# Patient Record
Sex: MALE | Race: WHITE | NOT HISPANIC OR LATINO | Employment: OTHER | RURAL
[De-identification: names, ages, dates, MRNs, and addresses within clinical notes are randomized per-mention and may not be internally consistent; named-entity substitution may affect disease eponyms.]

---

## 2020-08-10 ENCOUNTER — TELEPHONE (OUTPATIENT)
Dept: URGENT CARE | Facility: CLINIC | Age: 75
End: 2020-08-10

## 2020-08-10 NOTE — TELEPHONE ENCOUNTER
Please call to check on the patient, and please make sure he's arranged follow-up with his PCP.  I want to be certain that his symptoms have improved.  Please call me directly for concerns, and I'm happy to call him as well.

## 2020-08-10 NOTE — TELEPHONE ENCOUNTER
Spoke with Mr. Wilkes. Feeling no better but has made a follow-up appointment to see a specialist, Dr. Dent. Will  a CD of his xrays tomorrow.

## 2021-10-05 RX ORDER — GLIMEPIRIDE 4 MG/1
4 TABLET ORAL 2 TIMES DAILY
COMMUNITY

## 2021-10-05 RX ORDER — ALENDRONATE SODIUM 70 MG/1
70 TABLET ORAL
COMMUNITY

## 2021-10-12 ENCOUNTER — ANESTHESIA EVENT (OUTPATIENT)
Dept: GASTROENTEROLOGY | Facility: HOSPITAL | Age: 76
End: 2021-10-12

## 2021-10-12 NOTE — ANESTHESIA PREPROCEDURE EVALUATION
Anesthesia Evaluation     Patient summary reviewed and Nursing notes reviewed   no history of anesthetic complications:  NPO Solid Status: > 8 hours  NPO Liquid Status: > 8 hours           Airway   Dental      Pulmonary    Cardiovascular     PT is on anticoagulation therapy  Patient on routine beta blocker    (+) hypertension, valvular problems/murmurs MR, past MI , CAD, cardiac stents dysrhythmias Atrial Fib, hyperlipidemia,       Neuro/Psych  GI/Hepatic/Renal/Endo    (+)   renal disease, diabetes mellitus, thyroid problem     Musculoskeletal     (+) back pain,   Abdominal    Substance History      OB/GYN          Other   blood dyscrasia anemia,     ROS/Med Hx Other: Colon polyps, goiter, proteinuria    PSH  ANGIOPLASTY CORONARY STENT PLACEMENT                   Anesthesia Plan    ASA 3     MAC   (Patient identified; pre-operative vital signs, all relevant labs/studies, complete medical/surgical/anesthetic history, full medication list, full allergy list, and NPO status obtained/reviewed; physical assessment performed; anesthetic options, side effects, potential complications, risks, and benefits discussed; questions answered; written anesthesia consent obtained; patient cleared for procedure; anesthesia machine and equipment checked and functioning)    Anesthetic plan, all risks, benefits, and alternatives have been provided, discussed and informed consent has been obtained with: patient.

## 2021-10-13 ENCOUNTER — HOSPITAL ENCOUNTER (OUTPATIENT)
Facility: HOSPITAL | Age: 76
Setting detail: HOSPITAL OUTPATIENT SURGERY
Discharge: HOME | End: 2021-10-13
Attending: INTERNAL MEDICINE | Admitting: INTERNAL MEDICINE

## 2021-10-13 ENCOUNTER — ANESTHESIA (OUTPATIENT)
Dept: GASTROENTEROLOGY | Facility: HOSPITAL | Age: 76
End: 2021-10-13

## 2021-10-13 ENCOUNTER — ON CAMPUS - OUTPATIENT (AMBULATORY)
Dept: URBAN - METROPOLITAN AREA HOSPITAL 85 | Facility: HOSPITAL | Age: 76
End: 2021-10-13
Payer: COMMERCIAL

## 2021-10-13 VITALS
HEART RATE: 62 BPM | HEIGHT: 68 IN | RESPIRATION RATE: 20 BRPM | SYSTOLIC BLOOD PRESSURE: 165 MMHG | TEMPERATURE: 98.3 F | BODY MASS INDEX: 26.37 KG/M2 | OXYGEN SATURATION: 98 % | WEIGHT: 174 LBS | DIASTOLIC BLOOD PRESSURE: 81 MMHG

## 2021-10-13 DIAGNOSIS — D12.3 BENIGN NEOPLASM OF TRANSVERSE COLON: ICD-10-CM

## 2021-10-13 DIAGNOSIS — Z86.010 PERSONAL HISTORY OF COLONIC POLYPS: ICD-10-CM

## 2021-10-13 DIAGNOSIS — D12.5 BENIGN NEOPLASM OF SIGMOID COLON: ICD-10-CM

## 2021-10-13 DIAGNOSIS — D12.2 BENIGN NEOPLASM OF ASCENDING COLON: ICD-10-CM

## 2021-10-13 DIAGNOSIS — K57.30 DIVERTICULOSIS OF LARGE INTESTINE WITHOUT PERFORATION OR ABS: ICD-10-CM

## 2021-10-13 DIAGNOSIS — Z08 ENCOUNTER FOR FOLLOW-UP EXAMINATION AFTER COMPLETED TREATMEN: ICD-10-CM

## 2021-10-13 LAB
GLUCOSE BLDC GLUCOMTR-MCNC: 213 MG/DL (ref 70–105)
GLUCOSE BLDC GLUCOMTR-MCNC: 69 MG/DL (ref 70–105)
INR PPP: 1.12 (ref 2–3)
PROTHROMBIN TIME: 12.3 SECONDS (ref 19.4–28.5)

## 2021-10-13 PROCEDURE — 85610 PROTHROMBIN TIME: CPT | Performed by: INTERNAL MEDICINE

## 2021-10-13 PROCEDURE — 45385 COLONOSCOPY W/LESION REMOVAL: CPT | Mod: PT | Performed by: INTERNAL MEDICINE

## 2021-10-13 PROCEDURE — 82962 GLUCOSE BLOOD TEST: CPT

## 2021-10-13 PROCEDURE — 25010000002 PROPOFOL 10 MG/ML EMULSION: Performed by: ANESTHESIOLOGY

## 2021-10-13 PROCEDURE — 88305 TISSUE EXAM BY PATHOLOGIST: CPT | Performed by: INTERNAL MEDICINE

## 2021-10-13 RX ORDER — PROPOFOL 10 MG/ML
VIAL (ML) INTRAVENOUS AS NEEDED
Status: DISCONTINUED | OUTPATIENT
Start: 2021-10-13 | End: 2021-10-13 | Stop reason: SURG

## 2021-10-13 RX ORDER — SODIUM CHLORIDE 9 MG/ML
50 INJECTION, SOLUTION INTRAVENOUS CONTINUOUS
Status: DISCONTINUED | OUTPATIENT
Start: 2021-10-13 | End: 2021-10-13 | Stop reason: HOSPADM

## 2021-10-13 RX ORDER — LIDOCAINE HYDROCHLORIDE 10 MG/ML
INJECTION, SOLUTION EPIDURAL; INFILTRATION; INTRACAUDAL; PERINEURAL AS NEEDED
Status: DISCONTINUED | OUTPATIENT
Start: 2021-10-13 | End: 2021-10-13 | Stop reason: SURG

## 2021-10-13 RX ADMIN — LIDOCAINE HYDROCHLORIDE 50 MG: 10 INJECTION, SOLUTION EPIDURAL; INFILTRATION; INTRACAUDAL; PERINEURAL at 10:15

## 2021-10-13 RX ADMIN — PROPOFOL 180 MG: 10 INJECTION, EMULSION INTRAVENOUS at 10:15

## 2021-10-13 RX ADMIN — SODIUM CHLORIDE 50 ML/HR: 9 INJECTION, SOLUTION INTRAVENOUS at 08:35

## 2021-10-13 NOTE — ANESTHESIA POSTPROCEDURE EVALUATION
Patient: Ish Wilkes    Procedure Summary     Date: 10/13/21 Room / Location: [facility] ENDOSCOPY 1 / [facility] ENDOSCOPY    Anesthesia Start: 1009 Anesthesia Stop: 1037    Procedure: COLONOSCOPY with polypectomy x 5 (N/A ) Diagnosis:       Personal history of colonic polyps      (Personal history of colonic polyps [Z86.010])    Surgeons: Slick Cardenas MD Provider: Mingo Chase MD    Anesthesia Type: MAC ASA Status: 3          Anesthesia Type: MAC    Vitals  No vitals data found for the desired time range.          Post Anesthesia Care and Evaluation    Patient location during evaluation: PACU  Patient participation: complete - patient participated  Level of consciousness: awake  Pain scale: See nurse's notes for pain score.  Pain management: adequate  Airway patency: patent  Anesthetic complications: No anesthetic complications  PONV Status: none  Cardiovascular status: acceptable  Respiratory status: acceptable  Hydration status: acceptable    Comments: Patient seen and examined postoperatively; vital signs stable; SpO2 greater than or equal to 90%; cardiopulmonary status stable; nausea/vomiting adequately controlled; pain adequately controlled; no apparent anesthesia complications; patient discharged from anesthesia care when discharge criteria were met

## 2021-10-13 NOTE — OP NOTE
COLONOSCOPY  Procedure Report    Patient Name:  Ish Wilkes  YOB: 1945    Date of Surgery:  10/13/2021     Indications: Personal history of colon polyps    Pre-op Diagnosis:   Personal history of colonic polyps [Z86.010]         Post-op Diagnosis:  Colonoscopy with snare polypectomy x5  Mild diverticulosis  Small grade 2 internal hemorrhoids      Procedure(s):  COLONOSCOPY with polypectomy x 5    Staff:  Surgeon(s):  Slick Cardenas MD         Anesthesia: Monitored Anesthesia Care    Estimated Blood Loss: None  Implants:    Nothing was implanted during the procedure    Specimen:          1 ascending colon polyp  1 transverse colon polyp  3 sigmoid polyps    Complications:  None    Description of Procedure:  Informed consent was obtained from the patient.  They were placed in the left lateral decubitus position and IV conscious sedation was administered by anesthesia while being monitored continuously throughout the procedure.  Digital rectal exam was unremarkable.  There were no external hemorrhoid, fissure, or fistula.  The video Olympus colonoscope was introduced into the rectum advanced to the cecum where the ileocecal valve and appendiceal orifice were identified and photographed.  The prep was excellent.  On slow withdrawal close circumferential colonic mucosal inspection was performed.  There is a 6 mm semipedunculated polyp in the ascending colon cold snared and removed.  There was a 5 mm transverse colon polyp that was sessile.  It was cold snared removed.  There were 3 3 mm sigmoid polyps all sessile, cold snared and removed.  Retroflexion showed small grade 2 internal hemorrhoids.  There were few scattered sigmoid diverticula.  The scope was removed he tolerated the procedure well returned to recovery in stable condition.    Impression:  Colon polyps  Diverticulosis  Small internal hemorrhoids    Recommendations:  High-fiber diet  Call for any postop problems  Call in 3 days  for biopsies  No recall for colonoscopy  We appreciate the referral thank you      Slick Cardenas MD     Date: 10/13/2021  Time: 10:31 EDT

## 2021-10-13 NOTE — H&P
LOS: 0 days   Patient Care Team:  Ish Chacon MD as PCP - General (Internal Medicine)      Subjective     Interval History:     Subjective: Personal history of colon polyps      ROS:   No chest pain, shortness of breath, or cough.  No melena or hematochezia         Medication Review:   No current facility-administered medications for this encounter.    Current Outpatient Medications:   •  alendronate (FOSAMAX) 70 MG tablet, Take 70 mg by mouth Every 7 (Seven) Days., Disp: , Rfl:   •  amLODIPine (NORVASC) 5 MG tablet, Take 10 mg by mouth Daily., Disp: , Rfl:   •  atorvastatin (LIPITOR) 40 MG tablet, Take 40 mg by mouth Daily., Disp: , Rfl:   •  fenofibrate (TRICOR) 145 MG tablet, Take 145 mg by mouth Daily., Disp: , Rfl:   •  furosemide (LASIX) 40 MG tablet, Take 40 mg by mouth Daily., Disp: , Rfl:   •  glimepiride (AMARYL) 4 MG tablet, Take 4 mg by mouth 2 (two) times a day., Disp: , Rfl:   •  hydroCHLOROthiazide (HYDRODIURIL) 12.5 MG tablet, Take 12.5 mg by mouth Daily., Disp: , Rfl:   •  lisinopril (PRINIVIL,ZESTRIL) 40 MG tablet, Take 40 mg by mouth Daily., Disp: , Rfl:   •  metFORMIN (GLUCOPHAGE) 1000 MG tablet, Take 500 mg by mouth 2 (Two) Times a Day With Meals., Disp: , Rfl:   •  metoprolol tartrate (LOPRESSOR) 50 MG tablet, Take 50 mg by mouth Daily., Disp: , Rfl:   •  Multiple Vitamins-Minerals (ICAPS AREDS FORMULA PO), Take 1 tablet by mouth 2 (two) times a day., Disp: , Rfl:   •  pioglitazone (ACTOS) 45 MG tablet, Take 45 mg by mouth Daily., Disp: , Rfl:   •  potassium chloride (K-DUR,KLOR-CON) 20 MEQ CR tablet, Take 20 mEq by mouth 2 (Two) Times a Day., Disp: , Rfl:   •  warfarin (COUMADIN) 3 MG tablet, Take 3 mg by mouth Daily. LD 10/7, Disp: , Rfl:   •  glipizide (GLUCOTROL) 5 MG tablet, Take 5 mg by mouth 2 (Two) Times a Day Before Meals. No longer taking, Disp: , Rfl:   •  mupirocin (BACTROBAN) 2 % ointment, Apply  topically to the appropriate area as directed 3 (Three) Times a Day., Disp: 22  "g, Rfl: 0  •  Niacin ER 1000 MG tablet controlled-release, Take  by mouth. No longer taking, Disp: , Rfl:       Objective     Vital Signs  Vitals:    10/05/21 1509   Weight: 78 kg (172 lb)   Height: 172.7 cm (68\")       Physical Exam:    General Appearance:    Awake and alert, in no acute distress   Head:    Normocephalic, without obvious abnormality   Eyes:          Conjunctivae normal, anicteric sclera   Throat:   No oral lesions, no thrush, oral mucosa moist   Neck:   No adenopathy, supple, no JVD   CV/Lungs:     RRR, respirations regular, even and unlabored   Abdomen:     Soft, non-tender, no rebound or guarding, non-distended, no hepatosplenomegaly   Rectal:     Deferred   Extremities:   No edema, no cyanosis   Skin:   No bruising or rash, no jaundice        Results Review:    Lab Results (last 24 hours)     ** No results found for the last 24 hours. **          Imaging Results (Last 24 Hours)     ** No results found for the last 24 hours. **            Assessment/Plan   Proceed with scope and MAC anesthesia    Proceed with surveillance colonoscopy    Slick Cardenas MD  10/13/21  07:28 EDT  "

## 2021-10-13 NOTE — DISCHARGE INSTRUCTIONS
A responsible adult should stay with you and you should rest quietly for the rest of the day.    Do not drink alcohol, drive, operate any heavy machinery or power tools or make any legal/important decisions for the next 24 hours.     Progress your diet as tolerated.  If you begin to experience severe pain, increased shortness of breath, racing heartbeat or a fever above 101 F, seek immediate medical attention.     Follow up with MD as instructed. Call office for results in 3 to 5 days if needed.    DR CONCEPCION [phone]    RESTART COUMADIN 10/13/2021 AT NIGHTIME

## 2021-10-14 LAB
LAB AP CASE REPORT: NORMAL
PATH REPORT.FINAL DX SPEC: NORMAL
PATH REPORT.GROSS SPEC: NORMAL

## 2022-09-21 PROCEDURE — U0004 COV-19 TEST NON-CDC HGH THRU: HCPCS | Performed by: FAMILY MEDICINE

## 2022-10-21 ENCOUNTER — TRANSCRIBE ORDERS (OUTPATIENT)
Dept: ADMINISTRATIVE | Facility: HOSPITAL | Age: 77
End: 2022-10-21

## 2022-10-21 DIAGNOSIS — R80.9 PROTEINURIA, UNSPECIFIED TYPE: Primary | ICD-10-CM

## 2022-10-28 ENCOUNTER — APPOINTMENT (OUTPATIENT)
Dept: ULTRASOUND IMAGING | Facility: HOSPITAL | Age: 77
End: 2022-10-28

## 2022-11-04 ENCOUNTER — HOSPITAL ENCOUNTER (OUTPATIENT)
Dept: ULTRASOUND IMAGING | Facility: HOSPITAL | Age: 77
Discharge: HOME | End: 2022-11-04

## 2022-11-04 ENCOUNTER — LAB (OUTPATIENT)
Dept: LAB | Facility: HOSPITAL | Age: 77
End: 2022-11-04

## 2022-11-04 ENCOUNTER — TRANSCRIBE ORDERS (OUTPATIENT)
Dept: ADMINISTRATIVE | Facility: HOSPITAL | Age: 77
End: 2022-11-04

## 2022-11-04 DIAGNOSIS — R80.9 PROTEINURIA, UNSPECIFIED TYPE: ICD-10-CM

## 2022-11-04 DIAGNOSIS — R80.9 PROTEINURIA, UNSPECIFIED TYPE: Primary | ICD-10-CM

## 2022-11-04 LAB
ALBUMIN SERPL-MCNC: 3.2 G/DL (ref 3.5–5.2)
ALBUMIN/GLOB SERPL: 1.1 G/DL
ALP SERPL-CCNC: 68 U/L (ref 39–117)
ALT SERPL W P-5'-P-CCNC: 16 U/L (ref 1–41)
ANION GAP SERPL CALCULATED.3IONS-SCNC: 8 MMOL/L (ref 5–15)
AST SERPL-CCNC: 31 U/L (ref 1–40)
BACTERIA UR QL AUTO: NORMAL /HPF
BASOPHILS # BLD AUTO: 0.06 10*3/MM3 (ref 0–0.2)
BASOPHILS NFR BLD AUTO: 1.1 % (ref 0–1.5)
BILIRUB SERPL-MCNC: 0.3 MG/DL (ref 0–1.2)
BILIRUB UR QL STRIP: NEGATIVE
BUN SERPL-MCNC: 19 MG/DL (ref 8–23)
BUN/CREAT SERPL: 22.4 (ref 7–25)
CALCIUM SPEC-SCNC: 9.8 MG/DL (ref 8.6–10.5)
CHLORIDE SERPL-SCNC: 104 MMOL/L (ref 98–107)
CLARITY UR: CLEAR
CO2 SERPL-SCNC: 29 MMOL/L (ref 22–29)
COLOR UR: YELLOW
CREAT SERPL-MCNC: 0.85 MG/DL (ref 0.76–1.27)
CREAT UR-MCNC: 80.1 MG/DL
DEPRECATED RDW RBC AUTO: 46.9 FL (ref 37–54)
EGFRCR SERPLBLD CKD-EPI 2021: 88.9 ML/MIN/1.73
EOSINOPHIL # BLD AUTO: 0.4 10*3/MM3 (ref 0–0.4)
EOSINOPHIL NFR BLD AUTO: 7.2 % (ref 0.3–6.2)
ERYTHROCYTE [DISTWIDTH] IN BLOOD BY AUTOMATED COUNT: 14.1 % (ref 12.3–15.4)
GLOBULIN UR ELPH-MCNC: 2.9 GM/DL
GLUCOSE SERPL-MCNC: 205 MG/DL (ref 65–99)
GLUCOSE UR STRIP-MCNC: ABNORMAL MG/DL
HCT VFR BLD AUTO: 34.4 % (ref 37.5–51)
HGB BLD-MCNC: 11.2 G/DL (ref 13–17.7)
HGB UR QL STRIP.AUTO: NEGATIVE
HYALINE CASTS UR QL AUTO: NORMAL /LPF
IMM GRANULOCYTES # BLD AUTO: 0.02 10*3/MM3 (ref 0–0.05)
IMM GRANULOCYTES NFR BLD AUTO: 0.4 % (ref 0–0.5)
KETONES UR QL STRIP: NEGATIVE
LEUKOCYTE ESTERASE UR QL STRIP.AUTO: NEGATIVE
LYMPHOCYTES # BLD AUTO: 1 10*3/MM3 (ref 0.7–3.1)
LYMPHOCYTES NFR BLD AUTO: 18 % (ref 19.6–45.3)
MAGNESIUM SERPL-MCNC: 1.7 MG/DL (ref 1.6–2.4)
MCH RBC QN AUTO: 29.9 PG (ref 26.6–33)
MCHC RBC AUTO-ENTMCNC: 32.6 G/DL (ref 31.5–35.7)
MCV RBC AUTO: 92 FL (ref 79–97)
MONOCYTES # BLD AUTO: 0.82 10*3/MM3 (ref 0.1–0.9)
MONOCYTES NFR BLD AUTO: 14.8 % (ref 5–12)
NEUTROPHILS NFR BLD AUTO: 3.25 10*3/MM3 (ref 1.7–7)
NEUTROPHILS NFR BLD AUTO: 58.5 % (ref 42.7–76)
NITRITE UR QL STRIP: NEGATIVE
NRBC BLD AUTO-RTO: 0 /100 WBC (ref 0–0.2)
PH UR STRIP.AUTO: 6.5 [PH] (ref 5–8)
PLATELET # BLD AUTO: 367 10*3/MM3 (ref 140–450)
PMV BLD AUTO: 11.2 FL (ref 6–12)
POTASSIUM SERPL-SCNC: 5.9 MMOL/L (ref 3.5–5.2)
PROT ?TM UR-MCNC: 336.1 MG/DL
PROT SERPL-MCNC: 6.1 G/DL (ref 6–8.5)
PROT UR QL STRIP: ABNORMAL
PROT/CREAT UR: 4196 MG/G CREA (ref 0–200)
RBC # BLD AUTO: 3.74 10*6/MM3 (ref 4.14–5.8)
RBC # UR STRIP: NORMAL /HPF
REF LAB TEST METHOD: NORMAL
SODIUM SERPL-SCNC: 141 MMOL/L (ref 136–145)
SP GR UR STRIP: 1.02 (ref 1–1.03)
SQUAMOUS #/AREA URNS HPF: NORMAL /HPF
URATE SERPL-MCNC: 3.2 MG/DL (ref 3.4–7)
UROBILINOGEN UR QL STRIP: ABNORMAL
WBC # UR STRIP: NORMAL /HPF
WBC NRBC COR # BLD: 5.55 10*3/MM3 (ref 3.4–10.8)

## 2022-11-04 PROCEDURE — 76775 US EXAM ABDO BACK WALL LIM: CPT

## 2022-11-04 PROCEDURE — 81001 URINALYSIS AUTO W/SCOPE: CPT

## 2022-11-04 PROCEDURE — 85025 COMPLETE CBC W/AUTO DIFF WBC: CPT

## 2022-11-04 PROCEDURE — 86037 ANCA TITER EACH ANTIBODY: CPT

## 2022-11-04 PROCEDURE — 83516 IMMUNOASSAY NONANTIBODY: CPT

## 2022-11-04 PROCEDURE — 80053 COMPREHEN METABOLIC PANEL: CPT

## 2022-11-04 PROCEDURE — 86038 ANTINUCLEAR ANTIBODIES: CPT

## 2022-11-04 PROCEDURE — 82570 ASSAY OF URINE CREATININE: CPT

## 2022-11-04 PROCEDURE — 86335 IMMUNFIX E-PHORSIS/URINE/CSF: CPT

## 2022-11-04 PROCEDURE — 82784 ASSAY IGA/IGD/IGG/IGM EACH: CPT

## 2022-11-04 PROCEDURE — 36415 COLL VENOUS BLD VENIPUNCTURE: CPT

## 2022-11-04 PROCEDURE — 86334 IMMUNOFIX E-PHORESIS SERUM: CPT

## 2022-11-04 PROCEDURE — 84156 ASSAY OF PROTEIN URINE: CPT

## 2022-11-04 PROCEDURE — 83735 ASSAY OF MAGNESIUM: CPT

## 2022-11-04 PROCEDURE — 84550 ASSAY OF BLOOD/URIC ACID: CPT

## 2022-11-07 LAB
ANA SER QL: NEGATIVE
C-ANCA TITR SER IF: NORMAL TITER
MYELOPEROXIDASE AB SER IA-ACNC: <0.2 UNITS (ref 0–0.9)
P-ANCA ATYPICAL TITR SER IF: NORMAL TITER
P-ANCA TITR SER IF: NORMAL TITER
PROTEINASE3 AB SER IA-ACNC: <0.2 UNITS (ref 0–0.9)

## 2022-11-08 LAB
IGA SERPL-MCNC: 296 MG/DL (ref 61–437)
IGG SERPL-MCNC: 753 MG/DL (ref 603–1613)
IGM SERPL-MCNC: 39 MG/DL (ref 15–143)
INTERPRETATION UR IFE-IMP: ABNORMAL
PROT PATTERN SERPL IFE-IMP: ABNORMAL

## 2024-01-11 RX ORDER — AMLODIPINE BESYLATE 10 MG/1
TABLET ORAL
COMMUNITY
End: 2024-01-12

## 2024-01-11 RX ORDER — ALBUTEROL SULFATE 90 UG/1
AEROSOL, METERED RESPIRATORY (INHALATION) EVERY 4 HOURS
COMMUNITY
End: 2024-01-12

## 2024-01-11 RX ORDER — ESCITALOPRAM OXALATE 10 MG/1
TABLET ORAL
COMMUNITY
End: 2024-01-12

## 2024-01-11 RX ORDER — IMIQUIMOD 12.5 MG/.25G
CREAM TOPICAL
COMMUNITY
End: 2024-01-12

## 2024-01-11 RX ORDER — BLOOD SUGAR DIAGNOSTIC
STRIP MISCELLANEOUS
COMMUNITY

## 2024-01-11 RX ORDER — HYDROCODONE BITARTRATE AND HOMATROPINE METHYLBROMIDE ORAL SOLUTION 5; 1.5 MG/5ML; MG/5ML
5 LIQUID ORAL EVERY 6 HOURS SCHEDULED
COMMUNITY
End: 2024-01-12

## 2024-01-12 ENCOUNTER — LAB (OUTPATIENT)
Dept: LAB | Facility: HOSPITAL | Age: 79
End: 2024-01-12
Payer: MEDICARE

## 2024-01-12 ENCOUNTER — OFFICE VISIT (OUTPATIENT)
Dept: ENDOCRINOLOGY | Facility: CLINIC | Age: 79
End: 2024-01-12
Payer: MEDICARE

## 2024-01-12 ENCOUNTER — PATIENT ROUNDING (BHMG ONLY) (OUTPATIENT)
Dept: ENDOCRINOLOGY | Facility: CLINIC | Age: 79
End: 2024-01-12
Payer: COMMERCIAL

## 2024-01-12 VITALS
OXYGEN SATURATION: 97 % | BODY MASS INDEX: 22.58 KG/M2 | SYSTOLIC BLOOD PRESSURE: 105 MMHG | DIASTOLIC BLOOD PRESSURE: 70 MMHG | HEART RATE: 82 BPM | HEIGHT: 68 IN | WEIGHT: 149 LBS

## 2024-01-12 DIAGNOSIS — E04.0 SIMPLE GOITER: ICD-10-CM

## 2024-01-12 DIAGNOSIS — E05.90 HYPERTHYROIDISM: ICD-10-CM

## 2024-01-12 DIAGNOSIS — E05.90 HYPERTHYROIDISM: Primary | ICD-10-CM

## 2024-01-12 LAB
ALBUMIN SERPL-MCNC: 4.5 G/DL (ref 3.5–5.2)
ALBUMIN/GLOB SERPL: 1.8 G/DL
ALP SERPL-CCNC: 70 U/L (ref 39–117)
ALT SERPL W P-5'-P-CCNC: 23 U/L (ref 1–41)
ANION GAP SERPL CALCULATED.3IONS-SCNC: 12 MMOL/L (ref 5–15)
AST SERPL-CCNC: 23 U/L (ref 1–40)
BASOPHILS # BLD AUTO: 0.05 10*3/MM3 (ref 0–0.2)
BASOPHILS NFR BLD AUTO: 0.8 % (ref 0–1.5)
BILIRUB SERPL-MCNC: 0.4 MG/DL (ref 0–1.2)
BUN SERPL-MCNC: 51 MG/DL (ref 8–23)
BUN/CREAT SERPL: 28.3 (ref 7–25)
CALCIUM SPEC-SCNC: 10 MG/DL (ref 8.6–10.5)
CHLORIDE SERPL-SCNC: 102 MMOL/L (ref 98–107)
CO2 SERPL-SCNC: 24 MMOL/L (ref 22–29)
CREAT SERPL-MCNC: 1.8 MG/DL (ref 0.76–1.27)
DEPRECATED RDW RBC AUTO: 43 FL (ref 37–54)
EGFRCR SERPLBLD CKD-EPI 2021: 37.8 ML/MIN/1.73
EOSINOPHIL # BLD AUTO: 0.28 10*3/MM3 (ref 0–0.4)
EOSINOPHIL NFR BLD AUTO: 4.3 % (ref 0.3–6.2)
ERYTHROCYTE [DISTWIDTH] IN BLOOD BY AUTOMATED COUNT: 12.5 % (ref 12.3–15.4)
ERYTHROCYTE [SEDIMENTATION RATE] IN BLOOD: 4 MM/HR (ref 0–20)
GLOBULIN UR ELPH-MCNC: 2.5 GM/DL
GLUCOSE SERPL-MCNC: 181 MG/DL (ref 65–99)
HCT VFR BLD AUTO: 36.2 % (ref 37.5–51)
HGB BLD-MCNC: 11.8 G/DL (ref 13–17.7)
IMM GRANULOCYTES # BLD AUTO: 0.05 10*3/MM3 (ref 0–0.05)
IMM GRANULOCYTES NFR BLD AUTO: 0.8 % (ref 0–0.5)
LYMPHOCYTES # BLD AUTO: 1.17 10*3/MM3 (ref 0.7–3.1)
LYMPHOCYTES NFR BLD AUTO: 17.8 % (ref 19.6–45.3)
MCH RBC QN AUTO: 31 PG (ref 26.6–33)
MCHC RBC AUTO-ENTMCNC: 32.6 G/DL (ref 31.5–35.7)
MCV RBC AUTO: 95 FL (ref 79–97)
MONOCYTES # BLD AUTO: 0.88 10*3/MM3 (ref 0.1–0.9)
MONOCYTES NFR BLD AUTO: 13.4 % (ref 5–12)
NEUTROPHILS NFR BLD AUTO: 4.13 10*3/MM3 (ref 1.7–7)
NEUTROPHILS NFR BLD AUTO: 62.9 % (ref 42.7–76)
NRBC BLD AUTO-RTO: 0 /100 WBC (ref 0–0.2)
PLATELET # BLD AUTO: 254 10*3/MM3 (ref 140–450)
PMV BLD AUTO: 10.9 FL (ref 6–12)
POTASSIUM SERPL-SCNC: 5.4 MMOL/L (ref 3.5–5.2)
PROT SERPL-MCNC: 7 G/DL (ref 6–8.5)
RBC # BLD AUTO: 3.81 10*6/MM3 (ref 4.14–5.8)
SODIUM SERPL-SCNC: 138 MMOL/L (ref 136–145)
T3FREE SERPL-MCNC: 2.33 PG/ML (ref 2–4.4)
T4 FREE SERPL-MCNC: 2.17 NG/DL (ref 0.93–1.7)
TSH SERPL DL<=0.05 MIU/L-ACNC: 0.3 UIU/ML (ref 0.27–4.2)
WBC NRBC COR # BLD AUTO: 6.56 10*3/MM3 (ref 3.4–10.8)

## 2024-01-12 PROCEDURE — 85025 COMPLETE CBC W/AUTO DIFF WBC: CPT

## 2024-01-12 PROCEDURE — 80053 COMPREHEN METABOLIC PANEL: CPT

## 2024-01-12 PROCEDURE — 83529 ASAY OF INTERLEUKIN-6 (IL-6): CPT

## 2024-01-12 PROCEDURE — 85652 RBC SED RATE AUTOMATED: CPT

## 2024-01-12 PROCEDURE — 36415 COLL VENOUS BLD VENIPUNCTURE: CPT

## 2024-01-12 PROCEDURE — 84439 ASSAY OF FREE THYROXINE: CPT

## 2024-01-12 PROCEDURE — 84443 ASSAY THYROID STIM HORMONE: CPT

## 2024-01-12 PROCEDURE — 84481 FREE ASSAY (FT-3): CPT

## 2024-01-12 PROCEDURE — 86376 MICROSOMAL ANTIBODY EACH: CPT

## 2024-01-12 PROCEDURE — 84445 ASSAY OF TSI GLOBULIN: CPT

## 2024-01-12 RX ORDER — HYDROCODONE BITARTRATE AND ACETAMINOPHEN 5; 325 MG/1; MG/1
TABLET ORAL
COMMUNITY
End: 2024-01-12

## 2024-01-12 RX ORDER — PREDNISONE 20 MG/1
TABLET ORAL
COMMUNITY
Start: 2024-01-05

## 2024-01-12 RX ORDER — SPIRONOLACTONE 25 MG/1
TABLET ORAL
COMMUNITY
Start: 2023-11-07

## 2024-01-12 RX ORDER — APIXABAN 5 MG/1
TABLET, FILM COATED ORAL
COMMUNITY
Start: 2024-01-05

## 2024-01-12 RX ORDER — METHIMAZOLE 10 MG/1
TABLET ORAL
COMMUNITY
Start: 2024-01-05

## 2024-01-12 RX ORDER — LANCETS
EACH MISCELLANEOUS
COMMUNITY
Start: 2023-10-24

## 2024-01-12 RX ORDER — LEVALBUTEROL INHALATION SOLUTION 1.25 MG/3ML
SOLUTION RESPIRATORY (INHALATION)
COMMUNITY
End: 2024-01-12

## 2024-01-12 RX ORDER — HYDRALAZINE HYDROCHLORIDE 25 MG/1
TABLET, FILM COATED ORAL
COMMUNITY
Start: 2023-12-23 | End: 2024-01-12

## 2024-01-12 RX ORDER — BLOOD-GLUCOSE METER
KIT MISCELLANEOUS
COMMUNITY
End: 2024-01-12

## 2024-01-12 RX ORDER — INSULIN GLARGINE 100 [IU]/ML
12 INJECTION, SOLUTION SUBCUTANEOUS NIGHTLY
COMMUNITY
Start: 2023-12-28

## 2024-01-12 RX ORDER — FERROUS GLUCONATE 324(38)MG
TABLET ORAL
COMMUNITY
End: 2024-01-12

## 2024-01-12 RX ORDER — HYDRALAZINE HYDROCHLORIDE 100 MG/1
TABLET, FILM COATED ORAL
COMMUNITY
Start: 2023-11-17

## 2024-01-12 RX ORDER — AMIODARONE HYDROCHLORIDE 100 MG/1
100 TABLET ORAL DAILY
COMMUNITY
End: 2024-01-12

## 2024-01-12 RX ORDER — AMIODARONE HYDROCHLORIDE 200 MG/1
TABLET ORAL
COMMUNITY
Start: 2024-01-05

## 2024-01-12 RX ORDER — GLIPIZIDE 5 MG/1
TABLET, FILM COATED, EXTENDED RELEASE ORAL
COMMUNITY
End: 2024-01-12

## 2024-01-12 NOTE — PROGRESS NOTES
Endocrine Consult Outpatient  Referred by Dr. Ish Chacon for hyperthyroidism consultation  Patient Care Team:  Ish Chacon MD as PCP - General (Internal Medicine)     Chief Complaint: Hyperthyroidism         HPI: This is a 78-year-old male who is accompanied with his wife today with history of hypertension, hyperlipidemia, CAD with heart surgery done in March 2023, A-fib and head shock done about 3 months ago about 6 weeks ago noticed the thyroid was swollen so he saw his primary care physician.  He subsequently had a workup done which did show hyperthyroidism.  Dr. Chacon did: We discussed the case, he was on amiodarone from March 2023 to December 2023.  Amiodarone could have caused hyperthyroidism.  However amiodarone was stopped by his cardiologist in December 2023.  He was advised to start methimazole and prednisone which he has received only day before yesterday and has not started it yet.    He tells me he does have some fatigue and tiredness but nothing unusual.  He has gained some weight actually.  His neck had some swelling which has been improving he denies any tenderness in his neck.  Does have some cold feelings but denies any insomnia, mood swings, excessive sweating or palpitations or any other significant complaints.  No change in the appetite.    Past Medical History:   Diagnosis Date    A-fib     Anemia     Anticoagulated on Coumadin     Back pain     CAD (coronary artery disease)     Diabetes mellitus     Disease of thyroid gland     Goiter     Hyperlipidemia     Hypertension     Kidney disease     Mitral valve regurgitation     Myocardial infarction     Proteinuria        Social History     Socioeconomic History    Marital status:      Spouse name: Dotty    Number of children: 2    Years of education: 13   Tobacco Use    Smoking status: Never    Smokeless tobacco: Never    Tobacco comments:     no passive exp   Vaping Use    Vaping Use: Never used   Substance and Sexual Activity     Alcohol use: Never    Drug use: Defer    Sexual activity: Defer       Family History   Problem Relation Age of Onset    Hypertension Mother     Hypertension Father     Cancer Brother     Diabetes Brother     Thyroid disease Brother     Hypertension Brother        No Known Allergies    ROS:   Constitutional:  Denies fatigue, tiredness.    Eyes:  Denies change in visual acuity   HENT:  Denies nasal congestion or sore throat   Respiratory: denies cough, shortness of breath.   Cardiovascular:  denies chest pain, edema   GI:  Denies abdominal pain, nausea, vomiting.    :  Denies dysuria   Musculoskeletal:  Denies back pain or joint pain   Integument:  Denies dry skin, rash   Neurologic:  Denies headache, focal weakness or sensory changes   Endocrine:  Denies polyuria or polydipsia   Psychiatric:  Denies depression or anxiety      Vitals:    01/12/24 1145   BP: 105/70   Pulse: 82   SpO2: 97%     Body mass index is 22.66 kg/m².      Physical Exam:  GEN: NAD, conversant  EYES: EOMI, PERRL  NECK: Thyroid palpable on the right side.  CV: RRR  LUNG: CTA  SKIN: no rashes, no acanthosis  MSK: no deformities,   NEURO: no tremors, DTR normal  PSYCH: Awake and coherent      Results Review:     I reviewed the patient's new clinical results.    Lab Results   Component Value Date    GLUCOSE 205 (H) 11/04/2022    BUN 18 03/07/2023    CREATININE 0.57 (L) 03/07/2023    EGFRIFAFRI >60 03/07/2023    BCR 31.6 03/07/2023    K 3.7 03/07/2023    CO2 24 03/07/2023    CALCIUM 8.9 03/07/2023    ALBUMIN 3.1 (L) 03/01/2023    LABIL2 2.4 03/01/2023     (H) 03/01/2023     (H) 03/01/2023     Lab Results   Component Value Date    HGBA1C 8.7 (H) 02/21/2023     Lab Results   Component Value Date    CREATININE 0.57 (L) 03/07/2023     Labs from January 5, 2024 showed a TSH of 0.335, free T4 was 2.03.    Medication Review: Reviewed.       Current Outpatient Medications:     Accu-Chek Softclix Lancets lancets, , Disp: , Rfl:      alendronate (FOSAMAX) 70 MG tablet, Take 1 tablet by mouth Every 7 (Seven) Days., Disp: , Rfl:     amiodarone (PACERONE) 200 MG tablet, , Disp: , Rfl:     atorvastatin (LIPITOR) 40 MG tablet, Take 1 tablet by mouth Daily., Disp: , Rfl:     Eliquis 5 MG tablet tablet, , Disp: , Rfl:     furosemide (LASIX) 40 MG tablet, Take 1 tablet by mouth., Disp: , Rfl:     glucose blood (Accu-Chek Guide) test strip, Accu-Chek Guide test strips, Disp: , Rfl:     hydrALAZINE (APRESOLINE) 100 MG tablet, , Disp: , Rfl:     Lantus SoloStar 100 UNIT/ML injection pen, 12 Units Every Night., Disp: , Rfl:     lisinopril (PRINIVIL,ZESTRIL) 40 MG tablet, lisinopril 40 mg tablet, Disp: , Rfl:     metFORMIN (GLUCOPHAGE) 500 MG tablet, metformin 500 mg tablet, Disp: , Rfl:     methIMAzole (TAPAZOLE) 10 MG tablet, , Disp: , Rfl:     metoprolol succinate XL (TOPROL-XL) 50 MG 24 hr tablet, 0.5 tablets Daily., Disp: , Rfl:     Multiple Vitamins-Minerals (PRESERVISION AREDS 2 PO), Take  by mouth., Disp: , Rfl:     predniSONE (DELTASONE) 20 MG tablet, , Disp: , Rfl:     spironolactone (ALDACTONE) 25 MG tablet, , Disp: , Rfl:         Assessment and plan:  Hyperthyroidism: This is a 78-year-old male who has history of A-fib along with CAD as well as hypertension and hyperlipidemia was diagnosed to have hyperthyroidism and he has been on amiodarone for at least 9 months stopped in December 2023.  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  I am not sure whether he has type I versus type II.  I am going to check TSH with free T4, free T3, TSI, TPO antibodies, CBC and CMP today and arrange for thyroid ultrasound.  He does have methimazole and prednisone but has not started them yet and I have asked him to hold onto those until we have these labs back to see if he needs to do on 1 or both of these medications.  He verbalized understanding.    Thyroid enlargement: Will check thyroid ultrasound.    Peggy Mason MD FACE.

## 2024-01-12 NOTE — PROGRESS NOTES
January 12, 2024    Hello, may I speak with Ish Wilkes?    My name is Ramonita      I am  with [facility]  [address] [place] [id number].    Before we get started may I verify your date of birth? 1945    I am calling to officially welcome you to our practice and ask about your recent visit. Is this a good time to talk? yes    Tell me about your visit with us. What things went well?  it was fine       We're always looking for ways to make our patients' experiences even better. Do you have recommendations on ways we may improve?  no, you guys did great. Very efficient     Overall were you satisfied with your first visit to our practice? yes       I appreciate you taking the time to speak with me today. Is there anything else I can do for you? no      Thank you, and have a great day.

## 2024-01-13 LAB — THYROPEROXIDASE AB SERPL-ACNC: <9 IU/ML (ref 0–34)

## 2024-01-14 LAB
IL6 SERPL-MCNC: 4.3 PG/ML (ref 0–13)
TSI SER-ACNC: <0.1 IU/L (ref 0–0.55)

## 2024-01-26 ENCOUNTER — LAB (OUTPATIENT)
Dept: LAB | Facility: HOSPITAL | Age: 79
End: 2024-01-26
Payer: MEDICARE

## 2024-01-26 ENCOUNTER — OFFICE VISIT (OUTPATIENT)
Dept: ENDOCRINOLOGY | Facility: CLINIC | Age: 79
End: 2024-01-26
Payer: MEDICARE

## 2024-01-26 VITALS
HEART RATE: 68 BPM | OXYGEN SATURATION: 100 % | SYSTOLIC BLOOD PRESSURE: 120 MMHG | BODY MASS INDEX: 23.19 KG/M2 | DIASTOLIC BLOOD PRESSURE: 58 MMHG | RESPIRATION RATE: 16 BRPM | WEIGHT: 153 LBS | HEIGHT: 68 IN

## 2024-01-26 DIAGNOSIS — E05.90 HYPERTHYROIDISM: Primary | ICD-10-CM

## 2024-01-26 DIAGNOSIS — E05.90 HYPERTHYROIDISM: ICD-10-CM

## 2024-01-26 DIAGNOSIS — E04.2 MULTIPLE THYROID NODULES: ICD-10-CM

## 2024-01-26 LAB
T4 FREE SERPL-MCNC: 1.74 NG/DL (ref 0.93–1.7)
TSH SERPL DL<=0.05 MIU/L-ACNC: 0.19 UIU/ML (ref 0.27–4.2)

## 2024-01-26 PROCEDURE — 1159F MED LIST DOCD IN RCRD: CPT | Performed by: INTERNAL MEDICINE

## 2024-01-26 PROCEDURE — 1160F RVW MEDS BY RX/DR IN RCRD: CPT | Performed by: INTERNAL MEDICINE

## 2024-01-26 PROCEDURE — 84443 ASSAY THYROID STIM HORMONE: CPT

## 2024-01-26 PROCEDURE — 36415 COLL VENOUS BLD VENIPUNCTURE: CPT

## 2024-01-26 PROCEDURE — 84439 ASSAY OF FREE THYROXINE: CPT

## 2024-01-26 NOTE — PROGRESS NOTES
Endocrine Progress Note Outpatient     Patient Care Team:  Ish Chacon MD as PCP - General (Internal Medicine)    Chief Complaint: Follow-up hyperthyroidism          HPI: This is a 78-year-old male seen here few weeks ago for abnormal thyroid function test.  Does have a history of hypertension, hyperlipidemia, CAD with heart surgery done in March 2023.  He did have A-fib underwent shock and now tells me he does not have A-fib.  He was on amiodarone which was stopped in December 2023.  His initial test was consistent with hyperthyroidism.  His repeat testing did show a normal interleukin-6 with normal ESR and normal TSH but free T4 was mild high.  Clinically doing well.  Is not taking any antithyroid medications at this time.  Amiodarone has been stopped.      Thyroid nodules: No family history of thyroid cancer and no history of radiation exposure.  No trouble swallowing or choking.    Past Medical History:   Diagnosis Date    A-fib     Anemia     Anticoagulated on Coumadin     Back pain     CAD (coronary artery disease)     Diabetes mellitus     Disease of thyroid gland     Goiter     Hyperlipidemia     Hypertension     Kidney disease     Mitral valve regurgitation     Myocardial infarction     Proteinuria        Social History     Socioeconomic History    Marital status:      Spouse name: Dotty    Number of children: 2    Years of education: 13   Tobacco Use    Smoking status: Never    Smokeless tobacco: Never    Tobacco comments:     no passive exp   Vaping Use    Vaping Use: Never used   Substance and Sexual Activity    Alcohol use: Never    Drug use: Defer    Sexual activity: Defer       Family History   Problem Relation Age of Onset    Hypertension Mother     Hypertension Father     Cancer Brother     Diabetes Brother     Thyroid disease Brother     Hypertension Brother        No Known Allergies    ROS:   Constitutional:  Denies fatigue, tiredness.    Eyes:  Denies change in visual acuity   HENT:   Denies nasal congestion or sore throat   Respiratory: denies cough, shortness of breath.   Cardiovascular:  denies chest pain, edema   GI:  Denies abdominal pain, nausea, vomiting.   Musculoskeletal:  Denies back pain or joint pain   Integument:  Denies dry skin and rash   Neurologic:  Denies headache, focal weakness or sensory changes   Endocrine:  Denies polyuria or polydipsia   Psychiatric:  Denies depression or anxiety      Vitals:    01/26/24 1045   BP: 120/58   Pulse: 68   Resp: 16   SpO2: 100%     Body mass index is 23.26 kg/m².     Physical Exam:  GEN: NAD, conversant  EYES: EOMI, PERRL,  NECK: no thyromegaly,   CV: RRR  LUNG: CTA  SKIN: no rashes, no acanthosis  MSK: no deformities,   NEURO: no tremors, DTR normal  PSYCH: Awake and coherent      Results Review:     I reviewed the patient's new clinical results.    Lab Results   Component Value Date    HGBA1C 8.7 (H) 02/21/2023      Lab Results   Component Value Date    GLUCOSE 181 (H) 01/12/2024    BUN 51 (H) 01/12/2024    CREATININE 1.80 (H) 01/12/2024    EGFRIFAFRI >60 03/07/2023    BCR 28.3 (H) 01/12/2024    K 5.4 (H) 01/12/2024    CO2 24.0 01/12/2024    CALCIUM 10.0 01/12/2024    ALBUMIN 4.5 01/12/2024    LABIL2 2.4 03/01/2023    AST 23 01/12/2024    ALT 23 01/12/2024     Lab Results   Component Value Date    TSH 0.304 01/12/2024    FREET4 2.17 (H) 01/12/2024    THYROIDAB <9 01/12/2024     CBC Auto Differential (01/12/2024 12:43)    Comprehensive Metabolic Panel (01/12/2024 12:43)    T3, Free (01/12/2024 12:43)    T4, Free (01/12/2024 12:43)    Thyroid Peroxidase Antibody (01/12/2024 12:43)    Thyroid Stimulating Immunoglobulin (01/12/2024 12:43)    TSH (01/12/2024 12:43)    Sedimentation Rate (01/12/2024 12:43)    Interleukin-6 (01/12/2024 12:43)      Ultrasound thyroid was done on January 16, 2024 showed multiple thyroid nodules 2 on the right side and 1 on the left side.  1 on the right side was 1.9 cm TI-RADS 5 the second 1 on the right mid lobe is  2.1 cm thyroid 5 in the left side nodule was 3.2 cm TI-RADS 4.  Medication Review: Reviewed.       Current Outpatient Medications:     Accu-Chek Softclix Lancets lancets, , Disp: , Rfl:     alendronate (FOSAMAX) 70 MG tablet, Take 1 tablet by mouth Every 7 (Seven) Days., Disp: , Rfl:     amiodarone (PACERONE) 200 MG tablet, , Disp: , Rfl:     atorvastatin (LIPITOR) 40 MG tablet, Take 1 tablet by mouth Daily., Disp: , Rfl:     Eliquis 5 MG tablet tablet, , Disp: , Rfl:     furosemide (LASIX) 40 MG tablet, Take 1 tablet by mouth., Disp: , Rfl:     glucose blood (Accu-Chek Guide) test strip, Accu-Chek Guide test strips, Disp: , Rfl:     hydrALAZINE (APRESOLINE) 100 MG tablet, , Disp: , Rfl:     Lantus SoloStar 100 UNIT/ML injection pen, 12 Units Every Night., Disp: , Rfl:     lisinopril (PRINIVIL,ZESTRIL) 40 MG tablet, lisinopril 40 mg tablet, Disp: , Rfl:     metFORMIN (GLUCOPHAGE) 500 MG tablet, metformin 500 mg tablet, Disp: , Rfl:     methIMAzole (TAPAZOLE) 10 MG tablet, , Disp: , Rfl:     metoprolol succinate XL (TOPROL-XL) 50 MG 24 hr tablet, 0.5 tablets Daily., Disp: , Rfl:     Multiple Vitamins-Minerals (PRESERVISION AREDS 2 PO), Take  by mouth., Disp: , Rfl:     predniSONE (DELTASONE) 20 MG tablet, , Disp: , Rfl:     spironolactone (ALDACTONE) 25 MG tablet, , Disp: , Rfl:           Assessment and plan:  Hyperthyroidism: Last labs consistent with high free T4 but normal TSH.  He could be normalizing on its own.  Will recheck TSH and free T4 today.  Will hold off any treatment for now.  Ultrasound thyroid done with trying to get the report.  Will make recommendations after review of ultrasound report.    Multiple thyroid nodules: He has TI-RADS 5 nodules on the right superior lobe and right middle lobe and a thyroid for nodule on the left mid lobe.  Recommend ultrasound-guided biopsy of all these nodules.  First we will arrange for the biopsy of the right superior and right middle lobe and then will consider  lymph node biopsy if the other 2 are negative or benign.     Assessment and plan from January 12, 2024 reviewed and updated.      Peggy Mason MD FACE.

## 2024-02-05 DIAGNOSIS — E04.0 SIMPLE GOITER: ICD-10-CM

## 2024-02-05 DIAGNOSIS — E05.90 HYPERTHYROIDISM: Primary | ICD-10-CM

## 2024-02-05 DIAGNOSIS — E04.2 MULTIPLE THYROID NODULES: ICD-10-CM

## 2024-02-06 RX ORDER — METHIMAZOLE 10 MG/1
10 TABLET ORAL DAILY
Qty: 90 TABLET | Refills: 2 | Status: SHIPPED | OUTPATIENT
Start: 2024-02-06 | End: 2025-02-05

## 2024-02-14 ENCOUNTER — HOSPITAL ENCOUNTER (OUTPATIENT)
Dept: ULTRASOUND IMAGING | Facility: HOSPITAL | Age: 79
Discharge: HOME | End: 2024-02-14
Payer: COMMERCIAL

## 2024-02-14 DIAGNOSIS — E04.2 MULTIPLE THYROID NODULES: ICD-10-CM

## 2024-02-14 PROCEDURE — 19083 BX BREAST 1ST LESION US IMAG: CPT

## 2024-03-18 ENCOUNTER — LAB (OUTPATIENT)
Dept: LAB | Facility: HOSPITAL | Age: 79
End: 2024-03-18
Payer: MEDICARE

## 2024-03-18 ENCOUNTER — OFFICE VISIT (OUTPATIENT)
Dept: ENDOCRINOLOGY | Facility: CLINIC | Age: 79
End: 2024-03-18
Payer: COMMERCIAL

## 2024-03-18 VITALS
BODY MASS INDEX: 22.88 KG/M2 | OXYGEN SATURATION: 98 % | HEART RATE: 62 BPM | WEIGHT: 151 LBS | SYSTOLIC BLOOD PRESSURE: 105 MMHG | DIASTOLIC BLOOD PRESSURE: 70 MMHG | HEIGHT: 68 IN

## 2024-03-18 DIAGNOSIS — E04.0 SIMPLE GOITER: ICD-10-CM

## 2024-03-18 DIAGNOSIS — E05.90 HYPERTHYROIDISM: Primary | ICD-10-CM

## 2024-03-18 DIAGNOSIS — E05.90 HYPERTHYROIDISM: ICD-10-CM

## 2024-03-18 DIAGNOSIS — E04.2 MULTIPLE THYROID NODULES: ICD-10-CM

## 2024-03-18 LAB
T4 FREE SERPL-MCNC: 1.72 NG/DL (ref 0.93–1.7)
TSH SERPL DL<=0.05 MIU/L-ACNC: 0.03 UIU/ML (ref 0.27–4.2)

## 2024-03-18 PROCEDURE — G2211 COMPLEX E/M VISIT ADD ON: HCPCS | Performed by: INTERNAL MEDICINE

## 2024-03-18 PROCEDURE — 36415 COLL VENOUS BLD VENIPUNCTURE: CPT

## 2024-03-18 PROCEDURE — 84443 ASSAY THYROID STIM HORMONE: CPT

## 2024-03-18 PROCEDURE — 99214 OFFICE O/P EST MOD 30 MIN: CPT | Performed by: INTERNAL MEDICINE

## 2024-03-18 PROCEDURE — 84439 ASSAY OF FREE THYROXINE: CPT

## 2024-03-18 RX ORDER — AMOXICILLIN AND CLAVULANATE POTASSIUM 875; 125 MG/1; MG/1
TABLET, FILM COATED ORAL
COMMUNITY
End: 2024-03-18

## 2024-03-18 RX ORDER — METHIMAZOLE 10 MG/1
10 TABLET ORAL DAILY
Qty: 30 TABLET | Refills: 5 | Status: SHIPPED | OUTPATIENT
Start: 2024-03-18 | End: 2024-03-18

## 2024-03-18 RX ORDER — TRIAMCINOLONE ACETONIDE 55 UG/1
SPRAY, METERED NASAL
COMMUNITY
Start: 2024-02-02 | End: 2024-03-18

## 2024-03-18 RX ORDER — BENZONATATE 100 MG/1
CAPSULE ORAL
COMMUNITY
Start: 2024-02-02 | End: 2024-03-18

## 2024-03-18 RX ORDER — METHIMAZOLE 10 MG/1
10 TABLET ORAL 2 TIMES DAILY
Qty: 60 TABLET | Refills: 5 | Status: SHIPPED | OUTPATIENT
Start: 2024-03-18 | End: 2025-03-18

## 2024-03-18 NOTE — PATIENT INSTRUCTIONS
Continue methimazole 10 mg p.o. daily  Follow-up in 6 months with labs and arrange for thyroid ultrasound in 1 year.

## 2024-03-18 NOTE — PROGRESS NOTES
Endocrine Progress Note Outpatient     Patient Care Team:  Ish Chacon MD as PCP - General (Internal Medicine)    Chief Complaint: Follow-up hyperthyroidism    HPI: This is a 79-year-old male seen here few weeks ago for abnormal thyroid function test.  Does have a history of hypertension, hyperlipidemia, CAD with heart surgery done in March 2023.  He did have A-fib underwent shock and now tells me he does not have A-fib.  He was on amiodarone which was stopped in December 2023.  His initial test was consistent with hyperthyroidism.  His repeat testing did show a normal interleukin-6 with normal ESR and normal TSH but free T4 was mild high.  Clinically doing well.  Is not taking any antithyroid medications at this time.  Amiodarone has been stopped.  Subsequently found to have hyperthyroidism back in January 2024 and he was started on methimazole 10 mg once a day which she is still taking at this time.    Thyroid nodules: No family history of thyroid cancer and no history of radiation exposure.  No trouble swallowing or choking.  His ultrasound thyroid did show some suspicious nodules, he was referred for biopsy of the right dominant nodule however radiologist at the time of biopsy established that thyroid nodules have been stable for 12 years and therefore no biopsy was performed.    Past Medical History:   Diagnosis Date    A-fib     Anemia     Anticoagulated on Coumadin     Back pain     CAD (coronary artery disease)     Diabetes mellitus     Disease of thyroid gland     Goiter     Hyperlipidemia     Hypertension     Kidney disease     Mitral valve regurgitation     Myocardial infarction     Proteinuria        Social History     Socioeconomic History    Marital status:      Spouse name: Dotty    Number of children: 2    Years of education: 13   Tobacco Use    Smoking status: Never    Smokeless tobacco: Never    Tobacco comments:     no passive exp   Vaping Use    Vaping status: Never Used   Substance  and Sexual Activity    Alcohol use: Never    Drug use: Defer    Sexual activity: Defer       Family History   Problem Relation Age of Onset    Hypertension Mother     Hypertension Father     Cancer Brother     Diabetes Brother     Thyroid disease Brother     Hypertension Brother        No Known Allergies    ROS:   Constitutional:  Denies fatigue, tiredness.    Eyes:  Denies change in visual acuity   HENT:  Denies nasal congestion or sore throat   Respiratory: denies cough, shortness of breath.   Cardiovascular:  denies chest pain, edema   GI:  Denies abdominal pain, nausea, vomiting.   Musculoskeletal:  Denies back pain or joint pain   Integument:  Denies dry skin and rash   Neurologic:  Denies headache, focal weakness or sensory changes   Endocrine:  Denies polyuria or polydipsia   Psychiatric:  Denies depression or anxiety      Vitals:    03/18/24 1033   BP: 105/70   Pulse: 62   SpO2: 98%     Body mass index is 22.96 kg/m².     Physical Exam:  GEN: NAD, conversant  EYES: EOMI, PERRL,  NECK: no thyromegaly,   CV: RRR  LUNG: CTA  SKIN: no rashes, no acanthosis  MSK: no deformities,   NEURO: no tremors, DTR normal  PSYCH: Awake and coherent      Results Review:     I reviewed the patient's new clinical results.    Lab Results   Component Value Date    HGBA1C 8.7 (H) 02/21/2023      Lab Results   Component Value Date    GLUCOSE 181 (H) 01/12/2024    BUN 51 (H) 01/12/2024    CREATININE 1.80 (H) 01/12/2024    EGFRIFAFRI >60 03/07/2023    BCR 28.3 (H) 01/12/2024    K 5.4 (H) 01/12/2024    CO2 24.0 01/12/2024    CALCIUM 10.0 01/12/2024    ALBUMIN 4.5 01/12/2024    LABIL2 2.4 03/01/2023    AST 23 01/12/2024    ALT 23 01/12/2024     Lab Results   Component Value Date    TSH 0.188 (L) 01/26/2024    FREET4 1.74 (H) 01/26/2024    THYROIDAB <9 01/12/2024     CBC Auto Differential (01/12/2024 12:43)    Comprehensive Metabolic Panel (01/12/2024 12:43)    T3, Free (01/12/2024 12:43)    T4, Free (01/12/2024 12:43)    Thyroid  Peroxidase Antibody (01/12/2024 12:43)    Thyroid Stimulating Immunoglobulin (01/12/2024 12:43)    TSH (01/12/2024 12:43)    Sedimentation Rate (01/12/2024 12:43)    Interleukin-6 (01/12/2024 12:43)      Ultrasound thyroid was done on January 16, 2024 showed multiple thyroid nodules 2 on the right side and 1 on the left side.  1 on the right side was 1.9 cm TI-RADS 5 the second 1 on the right mid lobe is 2.1 cm thyroid 5 in the left side nodule was 3.2 cm TI-RADS 4.    US Discontinued Procedure (02/14/2024 12:53)      Medication Review: Reviewed.       Current Outpatient Medications:     Accu-Chek Softclix Lancets lancets, , Disp: , Rfl:     alendronate (FOSAMAX) 70 MG tablet, Take 1 tablet by mouth Every 7 (Seven) Days., Disp: , Rfl:     atorvastatin (LIPITOR) 40 MG tablet, Take 1 tablet by mouth Daily., Disp: , Rfl:     Eliquis 5 MG tablet tablet, , Disp: , Rfl:     furosemide (LASIX) 40 MG tablet, Take 1 tablet by mouth., Disp: , Rfl:     glucose blood (Accu-Chek Guide) test strip, Accu-Chek Guide test strips, Disp: , Rfl:     hydrALAZINE (APRESOLINE) 100 MG tablet, , Disp: , Rfl:     Lantus SoloStar 100 UNIT/ML injection pen, 12 Units Every Night., Disp: , Rfl:     lisinopril (PRINIVIL,ZESTRIL) 40 MG tablet, lisinopril 40 mg tablet, Disp: , Rfl:     metFORMIN (GLUCOPHAGE) 500 MG tablet, metformin 500 mg tablet, Disp: , Rfl:     methIMAzole (TAPAZOLE) 10 MG tablet, Take 1 tablet by mouth Daily., Disp: 90 tablet, Rfl: 2    metoprolol succinate XL (TOPROL-XL) 50 MG 24 hr tablet, 0.5 tablets Daily., Disp: , Rfl:     Multiple Vitamins-Minerals (PRESERVISION AREDS 2 PO), Take  by mouth., Disp: , Rfl:           Assessment and plan:    Hyperthyroidism: Currently on methimazole 10 mg p.o. daily, continue current medications.  Labs are pending.    Multiple thyroid nodules: Ultrasound thyroid did show multiple thyroid nodules but they have been stable for 12+ years.  Therefore the biopsy was not performed.        Peggy  MD Marlon FACE.

## 2024-05-16 NOTE — PROGRESS NOTES
Pelvic Tray at bedside. Pt getting ready for exam.   Start Methimazole 10 mg po daily and check Tsh, Free t4 in 6 weeks.

## 2024-10-07 ENCOUNTER — LAB (OUTPATIENT)
Dept: LAB | Facility: HOSPITAL | Age: 79
End: 2024-10-07
Payer: MEDICARE

## 2024-10-07 DIAGNOSIS — E04.2 MULTIPLE THYROID NODULES: ICD-10-CM

## 2024-10-07 DIAGNOSIS — E05.90 HYPERTHYROIDISM: ICD-10-CM

## 2024-10-07 LAB
ALBUMIN SERPL-MCNC: 3.5 G/DL (ref 3.5–5.2)
ALBUMIN/GLOB SERPL: 1.4 G/DL
ALP SERPL-CCNC: 66 U/L (ref 39–117)
ALT SERPL W P-5'-P-CCNC: 28 U/L (ref 1–41)
ANION GAP SERPL CALCULATED.3IONS-SCNC: 8.1 MMOL/L (ref 5–15)
AST SERPL-CCNC: 29 U/L (ref 1–40)
BASOPHILS # BLD AUTO: 0.06 10*3/MM3 (ref 0–0.2)
BASOPHILS NFR BLD AUTO: 1 % (ref 0–1.5)
BILIRUB SERPL-MCNC: 0.4 MG/DL (ref 0–1.2)
BUN SERPL-MCNC: 16 MG/DL (ref 8–23)
BUN/CREAT SERPL: 17.2 (ref 7–25)
CALCIUM SPEC-SCNC: 8.9 MG/DL (ref 8.6–10.5)
CHLORIDE SERPL-SCNC: 106 MMOL/L (ref 98–107)
CO2 SERPL-SCNC: 28.9 MMOL/L (ref 22–29)
CREAT SERPL-MCNC: 0.93 MG/DL (ref 0.76–1.27)
DEPRECATED RDW RBC AUTO: 42.5 FL (ref 37–54)
EGFRCR SERPLBLD CKD-EPI 2021: 83 ML/MIN/1.73
EOSINOPHIL # BLD AUTO: 0.44 10*3/MM3 (ref 0–0.4)
EOSINOPHIL NFR BLD AUTO: 7.4 % (ref 0.3–6.2)
ERYTHROCYTE [DISTWIDTH] IN BLOOD BY AUTOMATED COUNT: 13 % (ref 12.3–15.4)
GLOBULIN UR ELPH-MCNC: 2.5 GM/DL
GLUCOSE SERPL-MCNC: 101 MG/DL (ref 65–99)
HCT VFR BLD AUTO: 35 % (ref 37.5–51)
HGB BLD-MCNC: 11.3 G/DL (ref 13–17.7)
IMM GRANULOCYTES # BLD AUTO: 0.02 10*3/MM3 (ref 0–0.05)
IMM GRANULOCYTES NFR BLD AUTO: 0.3 % (ref 0–0.5)
LYMPHOCYTES # BLD AUTO: 1.12 10*3/MM3 (ref 0.7–3.1)
LYMPHOCYTES NFR BLD AUTO: 18.8 % (ref 19.6–45.3)
MCH RBC QN AUTO: 29.4 PG (ref 26.6–33)
MCHC RBC AUTO-ENTMCNC: 32.3 G/DL (ref 31.5–35.7)
MCV RBC AUTO: 90.9 FL (ref 79–97)
MONOCYTES # BLD AUTO: 0.73 10*3/MM3 (ref 0.1–0.9)
MONOCYTES NFR BLD AUTO: 12.3 % (ref 5–12)
NEUTROPHILS NFR BLD AUTO: 3.58 10*3/MM3 (ref 1.7–7)
NEUTROPHILS NFR BLD AUTO: 60.2 % (ref 42.7–76)
NRBC BLD AUTO-RTO: 0 /100 WBC (ref 0–0.2)
PLATELET # BLD AUTO: 223 10*3/MM3 (ref 140–450)
PMV BLD AUTO: 10.9 FL (ref 6–12)
POTASSIUM SERPL-SCNC: 3.5 MMOL/L (ref 3.5–5.2)
PROT SERPL-MCNC: 6 G/DL (ref 6–8.5)
RBC # BLD AUTO: 3.85 10*6/MM3 (ref 4.14–5.8)
SODIUM SERPL-SCNC: 143 MMOL/L (ref 136–145)
T4 FREE SERPL-MCNC: 1.21 NG/DL (ref 0.93–1.7)
TSH SERPL DL<=0.05 MIU/L-ACNC: 6.52 UIU/ML (ref 0.27–4.2)
WBC NRBC COR # BLD AUTO: 5.95 10*3/MM3 (ref 3.4–10.8)

## 2024-10-07 PROCEDURE — 84439 ASSAY OF FREE THYROXINE: CPT

## 2024-10-07 PROCEDURE — 84443 ASSAY THYROID STIM HORMONE: CPT

## 2024-10-07 PROCEDURE — 36415 COLL VENOUS BLD VENIPUNCTURE: CPT

## 2024-10-07 PROCEDURE — 85025 COMPLETE CBC W/AUTO DIFF WBC: CPT

## 2024-10-07 PROCEDURE — 80053 COMPREHEN METABOLIC PANEL: CPT

## 2024-10-09 RX ORDER — METOPROLOL SUCCINATE 25 MG/1
TABLET, EXTENDED RELEASE ORAL
COMMUNITY
Start: 2024-08-29

## 2024-10-09 RX ORDER — SPIRONOLACTONE 25 MG/1
1 TABLET ORAL DAILY
COMMUNITY
Start: 2024-03-12

## 2024-10-14 ENCOUNTER — OFFICE VISIT (OUTPATIENT)
Dept: ENDOCRINOLOGY | Facility: CLINIC | Age: 79
End: 2024-10-14
Payer: MEDICARE

## 2024-10-14 VITALS
HEART RATE: 97 BPM | OXYGEN SATURATION: 97 % | SYSTOLIC BLOOD PRESSURE: 155 MMHG | HEIGHT: 68 IN | BODY MASS INDEX: 22.73 KG/M2 | DIASTOLIC BLOOD PRESSURE: 70 MMHG | WEIGHT: 150 LBS

## 2024-10-14 DIAGNOSIS — E04.2 MULTIPLE THYROID NODULES: ICD-10-CM

## 2024-10-14 DIAGNOSIS — E05.90 HYPERTHYROIDISM: Primary | ICD-10-CM

## 2024-10-14 PROCEDURE — G2211 COMPLEX E/M VISIT ADD ON: HCPCS | Performed by: INTERNAL MEDICINE

## 2024-10-14 PROCEDURE — 1159F MED LIST DOCD IN RCRD: CPT | Performed by: INTERNAL MEDICINE

## 2024-10-14 PROCEDURE — 99214 OFFICE O/P EST MOD 30 MIN: CPT | Performed by: INTERNAL MEDICINE

## 2024-10-14 PROCEDURE — 1160F RVW MEDS BY RX/DR IN RCRD: CPT | Performed by: INTERNAL MEDICINE

## 2024-10-14 RX ORDER — METHIMAZOLE 10 MG/1
TABLET ORAL
Qty: 30 TABLET | Refills: 5 | Status: SHIPPED | OUTPATIENT
Start: 2024-10-14

## 2024-10-14 NOTE — PROGRESS NOTES
Endocrine Progress Note Outpatient     Patient Care Team:  Ish Chacon MD as PCP - General (Internal Medicine)    Chief Complaint: Follow-up hyperthyroidism    HPI: This is a 79-year-old male seen here few weeks ago for abnormal thyroid function test.  Does have a history of hypertension, hyperlipidemia, CAD with heart surgery done in March 2023.  He did have A-fib underwent shock and now tells me he does not have A-fib.  He was on amiodarone which was stopped in December 2023.  His initial test was consistent with hyperthyroidism.  His repeat testing did show a normal interleukin-6 with normal ESR and normal TSH but free T4 was mild high.  Clinically doing well.  Amiodarone has been stopped.  Subsequently found to have hyperthyroidism back in January 2024 and he was started on methimazole and is now on 10 mg twice a day.  He is complaining of throat dryness..    Thyroid nodules: No family history of thyroid cancer and no history of radiation exposure.  No trouble swallowing or choking.  His ultrasound thyroid did show some suspicious nodules, he was referred for biopsy of the right dominant nodule however radiologist at the time of biopsy established that thyroid nodules have been stable for 12 years and therefore no biopsy was performed.    Past Medical History:   Diagnosis Date    A-fib     Anemia     Anticoagulated on Coumadin     Back pain     CAD (coronary artery disease)     Diabetes mellitus     Disease of thyroid gland     Goiter     Hyperlipidemia     Hypertension     Kidney disease     Mitral valve regurgitation     Myocardial infarction     Proteinuria        Social History     Socioeconomic History    Marital status:      Spouse name: Dotty    Number of children: 2    Years of education: 13   Tobacco Use    Smoking status: Never    Smokeless tobacco: Never    Tobacco comments:     no passive exp   Vaping Use    Vaping status: Never Used   Substance and Sexual Activity    Alcohol use: Never     Drug use: Defer    Sexual activity: Defer       Family History   Problem Relation Age of Onset    Hypertension Mother     Hypertension Father     Cancer Brother     Diabetes Brother     Thyroid disease Brother     Hypertension Brother        No Known Allergies    ROS:   Constitutional:  Denies fatigue, tiredness.    Eyes:  Denies change in visual acuity   HENT:  Denies nasal congestion or sore throat   Respiratory: denies cough, shortness of breath.   Cardiovascular:  denies chest pain, edema   GI:  Denies abdominal pain, nausea, vomiting.   Musculoskeletal:  Denies back pain or joint pain   Integument:  Denies dry skin and rash   Neurologic:  Denies headache, focal weakness or sensory changes   Endocrine:  Denies polyuria or polydipsia   Psychiatric:  Denies depression or anxiety      Vitals:    10/14/24 1020   BP: 155/70   Pulse: 97   SpO2: 97%     Body mass index is 22.81 kg/m².     Physical Exam:  GEN: NAD, conversant  EYES: EOMI, PERRL,  NECK: no thyromegaly,   CV: RRR  LUNG: CTA  PSYCH: Awake and coherent      Results Review:     I reviewed the patient's new clinical results.    Lab Results   Component Value Date    HGBA1C 8.7 (H) 02/21/2023      Lab Results   Component Value Date    GLUCOSE 101 (H) 10/07/2024    BUN 16 10/07/2024    CREATININE 0.93 10/07/2024    EGFRIFAFRI >60 03/07/2023    BCR 17.2 10/07/2024    K 3.5 10/07/2024    CO2 28.9 10/07/2024    CALCIUM 8.9 10/07/2024    ALBUMIN 3.5 10/07/2024    LABIL2 2.4 03/01/2023    AST 29 10/07/2024    ALT 28 10/07/2024     Lab Results   Component Value Date    TSH 6.520 (H) 10/07/2024    FREET4 1.21 10/07/2024    THYROIDAB <9 01/12/2024     CBC Auto Differential (01/12/2024 12:43)    Comprehensive Metabolic Panel (01/12/2024 12:43)    T3, Free (01/12/2024 12:43)    T4, Free (01/12/2024 12:43)    Thyroid Peroxidase Antibody (01/12/2024 12:43)    Thyroid Stimulating Immunoglobulin (01/12/2024 12:43)    TSH (01/12/2024 12:43)    Sedimentation Rate (01/12/2024  12:43)    Interleukin-6 (01/12/2024 12:43)      Ultrasound thyroid was done on January 16, 2024 showed multiple thyroid nodules 2 on the right side and 1 on the left side.  1 on the right side was 1.9 cm TI-RADS 5 the second 1 on the right mid lobe is 2.1 cm thyroid 5 in the left side nodule was 3.2 cm TI-RADS 4.    US Discontinued Procedure (02/14/2024 12:53)      Medication Review: Reviewed.       Current Outpatient Medications:     Accu-Chek Softclix Lancets lancets, , Disp: , Rfl:     alendronate (FOSAMAX) 70 MG tablet, Take 1 tablet by mouth Every 7 (Seven) Days., Disp: , Rfl:     atorvastatin (LIPITOR) 40 MG tablet, Take 1 tablet by mouth Daily., Disp: , Rfl:     Eliquis 5 MG tablet tablet, , Disp: , Rfl:     furosemide (LASIX) 40 MG tablet, Take 1 tablet by mouth., Disp: , Rfl:     glucose blood (Accu-Chek Guide) test strip, Accu-Chek Guide test strips, Disp: , Rfl:     hydrALAZINE (APRESOLINE) 100 MG tablet, , Disp: , Rfl:     Lantus SoloStar 100 UNIT/ML injection pen, 12 Units Every Night., Disp: , Rfl:     lisinopril (PRINIVIL,ZESTRIL) 40 MG tablet, lisinopril 40 mg tablet, Disp: , Rfl:     metFORMIN (GLUCOPHAGE) 500 MG tablet, metformin 500 mg tablet, Disp: , Rfl:     methIMAzole (TAPAZOLE) 10 MG tablet, Take 1 tablet by mouth 2 (Two) Times a Day., Disp: 60 tablet, Rfl: 5    metoprolol succinate XL (TOPROL-XL) 25 MG 24 hr tablet, , Disp: , Rfl:     metoprolol succinate XL (TOPROL-XL) 50 MG 24 hr tablet, 0.5 tablets Daily., Disp: , Rfl:     Multiple Vitamins-Minerals (PRESERVISION AREDS 2 PO), Take  by mouth., Disp: , Rfl:     spironolactone (ALDACTONE) 25 MG tablet, Take 1 tablet by mouth Daily., Disp: , Rfl:           Assessment and plan:    Hyperthyroidism: Uncontrolled with TSH mild high, will change methimazole to 10 mg once a day and follow labs.    Multiple thyroid nodules: Ultrasound thyroid did show multiple thyroid nodules but they have been stable for 12+ years.  Therefore the biopsy was not  performed.  Check thyroid ultrasound.    He follows with his primary care physician for rest of his medical problems.  He is complaining of some dry throat which could be due to lisinopril.  I have advised him to reach out to his primary care physician to address that.  He verbalized understanding.    Assessment and plan from March 18, 2024 reviewed and updated.        Peggy Mason MD FACE.

## 2024-10-14 NOTE — PATIENT INSTRUCTIONS
Change methimazole to 10 mg p.o. daily  Check TSH and free T4 in 6 weeks and before follow-up in 6 months  Arrange for thyroid ultrasound  Follow-up in 6 months.   Walk in [facility] [employer]

## 2024-10-29 ENCOUNTER — HOSPITAL ENCOUNTER (OUTPATIENT)
Dept: ULTRASOUND IMAGING | Facility: HOSPITAL | Age: 79
Discharge: HOME | End: 2024-10-29
Admitting: INTERNAL MEDICINE
Payer: MEDICARE

## 2024-10-29 DIAGNOSIS — E05.90 HYPERTHYROIDISM: ICD-10-CM

## 2024-10-29 DIAGNOSIS — E04.2 MULTIPLE THYROID NODULES: ICD-10-CM

## 2024-10-29 PROCEDURE — 76536 US EXAM OF HEAD AND NECK: CPT

## 2024-11-04 ENCOUNTER — TELEPHONE (OUTPATIENT)
Dept: ENDOCRINOLOGY | Facility: CLINIC | Age: 79
End: 2024-11-04
Payer: MEDICARE

## 2024-11-04 NOTE — TELEPHONE ENCOUNTER
Pt called and asked for his US results done on Oct 29 @ [facility]. Please advise.

## 2025-03-14 ENCOUNTER — HOSPITAL ENCOUNTER (OUTPATIENT)
Dept: ULTRASOUND IMAGING | Facility: HOSPITAL | Age: 80
Discharge: HOME | End: 2025-03-14
Payer: MEDICARE

## 2025-03-14 DIAGNOSIS — E05.90 HYPERTHYROIDISM: ICD-10-CM

## 2025-03-14 DIAGNOSIS — E04.2 MULTIPLE THYROID NODULES: ICD-10-CM

## 2025-03-14 PROCEDURE — 76536 US EXAM OF HEAD AND NECK: CPT

## 2025-05-06 ENCOUNTER — LAB (OUTPATIENT)
Dept: ENDOCRINOLOGY | Facility: CLINIC | Age: 80
End: 2025-05-06
Payer: MEDICARE

## 2025-05-06 DIAGNOSIS — E05.90 HYPERTHYROIDISM: ICD-10-CM

## 2025-05-06 DIAGNOSIS — E04.2 MULTIPLE THYROID NODULES: ICD-10-CM

## 2025-05-07 LAB
T4 FREE SERPL-MCNC: 1.43 NG/DL (ref 0.82–1.77)
TSH SERPL DL<=0.005 MIU/L-ACNC: 5.15 UIU/ML (ref 0.45–4.5)

## 2025-05-13 ENCOUNTER — OFFICE VISIT (OUTPATIENT)
Dept: ENDOCRINOLOGY | Facility: CLINIC | Age: 80
End: 2025-05-13
Payer: MEDICARE

## 2025-05-13 VITALS
SYSTOLIC BLOOD PRESSURE: 120 MMHG | HEIGHT: 68 IN | BODY MASS INDEX: 20 KG/M2 | DIASTOLIC BLOOD PRESSURE: 80 MMHG | HEART RATE: 83 BPM | WEIGHT: 132 LBS | OXYGEN SATURATION: 97 %

## 2025-05-13 DIAGNOSIS — E04.2 MULTIPLE THYROID NODULES: ICD-10-CM

## 2025-05-13 DIAGNOSIS — E05.90 HYPERTHYROIDISM: Primary | ICD-10-CM

## 2025-05-13 PROCEDURE — 1159F MED LIST DOCD IN RCRD: CPT | Performed by: INTERNAL MEDICINE

## 2025-05-13 PROCEDURE — G2211 COMPLEX E/M VISIT ADD ON: HCPCS | Performed by: INTERNAL MEDICINE

## 2025-05-13 PROCEDURE — 99214 OFFICE O/P EST MOD 30 MIN: CPT | Performed by: INTERNAL MEDICINE

## 2025-05-13 PROCEDURE — 1160F RVW MEDS BY RX/DR IN RCRD: CPT | Performed by: INTERNAL MEDICINE

## 2025-05-13 RX ORDER — METHIMAZOLE 5 MG/1
TABLET ORAL
Qty: 30 TABLET | Refills: 6 | Status: SHIPPED | OUTPATIENT
Start: 2025-05-13

## 2025-05-13 RX ORDER — POTASSIUM CHLORIDE 1500 MG/1
TABLET, EXTENDED RELEASE ORAL
COMMUNITY
Start: 2025-04-16

## 2025-05-13 RX ORDER — FERROUS GLUCONATE 324(38)MG
324 TABLET ORAL DAILY
COMMUNITY

## 2025-05-13 RX ORDER — LOTILANER OPHTHALMIC SOLUTION 2.5 MG/ML
SOLUTION/ DROPS OPHTHALMIC
COMMUNITY
Start: 2025-05-09

## 2025-05-13 NOTE — PATIENT INSTRUCTIONS
Decrease methimazole to 5 mg p.o. daily  Check TSH and free T4 in 6 weeks and before follow-up in 6 months.

## 2025-05-13 NOTE — PROGRESS NOTES
Endocrine Progress Note Outpatient     Patient Care Team:  Ish Chacon MD as PCP - General (Internal Medicine)    Chief Complaint: Follow-up hyperthyroidism    HPI: This is a 80-year-old male seen here few weeks ago for abnormal thyroid function test.  Does have a history of hypertension, hyperlipidemia, CAD with heart surgery done in March 2023.  He did have A-fib underwent shock and now tells me he does not have A-fib.  He was on amiodarone which was stopped in December 2023.  His initial test was consistent with hyperthyroidism.  His repeat testing did show a normal interleukin-6 with normal ESR and normal TSH but free T4 was mild high.  Clinically doing well.  Amiodarone has been stopped.  Subsequently found to have hyperthyroidism back in January 2024 and he was started on methimazole and is now on 10 mg once a day.  He is complaining of throat dryness..    Thyroid nodules: No family history of thyroid cancer and no history of radiation exposure.  No trouble swallowing or choking.  His ultrasound thyroid did show some suspicious nodules, he was referred for biopsy of the right dominant nodule however radiologist at the time of biopsy established that thyroid nodules have been stable for 12 years and therefore no biopsy was performed.    Past Medical History:   Diagnosis Date    A-fib     Anemia     Anticoagulated on Coumadin     Back pain     CAD (coronary artery disease)     Diabetes mellitus     Disease of thyroid gland     Goiter     Hyperlipidemia     Hypertension     Kidney disease     Mitral valve regurgitation     Myocardial infarction     Proteinuria        Social History     Socioeconomic History    Marital status:      Spouse name: Dotty    Number of children: 2    Years of education: 13   Tobacco Use    Smoking status: Never    Smokeless tobacco: Never    Tobacco comments:     no passive exp   Vaping Use    Vaping status: Never Used   Substance and Sexual Activity    Alcohol use: Never     Drug use: Defer    Sexual activity: Defer       Family History   Problem Relation Age of Onset    Hypertension Mother     Hypertension Father     Cancer Brother     Diabetes Brother     Thyroid disease Brother     Hypertension Brother        No Known Allergies    ROS:   Constitutional:  Denies fatigue, tiredness.    Eyes:  Denies change in visual acuity   HENT:  Denies nasal congestion or sore throat   Respiratory: denies cough, shortness of breath.   Cardiovascular:  denies chest pain, edema   GI:  Denies abdominal pain, nausea, vomiting.   Musculoskeletal:  Denies back pain or joint pain   Integument:  Denies dry skin and rash   Neurologic:  Denies headache, focal weakness or sensory changes   Endocrine:  Denies polyuria or polydipsia   Psychiatric:  Denies depression or anxiety      Vitals:    05/13/25 0918   BP: 120/80   Pulse: 83   SpO2: 97%     Body mass index is 20.07 kg/m².     Physical Exam:  GEN: NAD, conversant  EYES: EOMI, PERRL,  NECK: no thyromegaly,   CV: RRR  LUNG: CTA  PSYCH: Awake and coherent      Results Review:     I reviewed the patient's new clinical results.    Lab Results   Component Value Date    HGBA1C 8.7 (H) 02/21/2023      Lab Results   Component Value Date    GLUCOSE 101 (H) 10/07/2024    BUN 16 10/07/2024    CREATININE 0.93 10/07/2024    EGFRIFAFRI >60 03/07/2023    BCR 17.2 10/07/2024    K 3.5 10/07/2024    CO2 28.9 10/07/2024    CALCIUM 8.9 10/07/2024    ALBUMIN 3.5 10/07/2024    LABIL2 2.4 03/01/2023    AST 29 10/07/2024    ALT 28 10/07/2024     Lab Results   Component Value Date    TSH 5.150 (H) 05/06/2025    FREET4 1.43 05/06/2025    THYROIDAB <9 01/12/2024     CBC Auto Differential (01/12/2024 12:43)    Comprehensive Metabolic Panel (01/12/2024 12:43)    T3, Free (01/12/2024 12:43)    T4, Free (01/12/2024 12:43)    Thyroid Peroxidase Antibody (01/12/2024 12:43)    Thyroid Stimulating Immunoglobulin (01/12/2024 12:43)    TSH (01/12/2024 12:43)    Sedimentation Rate (01/12/2024  12:43)    Interleukin-6 (01/12/2024 12:43)      Ultrasound thyroid was done on January 16, 2024 showed multiple thyroid nodules 2 on the right side and 1 on the left side.  1 on the right side was 1.9 cm TI-RADS 5 the second 1 on the right mid lobe is 2.1 cm thyroid 5 in the left side nodule was 3.2 cm TI-RADS 4.    US Discontinued Procedure (02/14/2024 12:53)    US Thyroid (10/29/2024 10:42)  Medication Review: Reviewed.       Current Outpatient Medications:     Accu-Chek Softclix Lancets lancets, , Disp: , Rfl:     alendronate (FOSAMAX) 70 MG tablet, Take 1 tablet by mouth Every 7 (Seven) Days., Disp: , Rfl:     atorvastatin (LIPITOR) 40 MG tablet, Take 1 tablet by mouth Daily., Disp: , Rfl:     Eliquis 5 MG tablet tablet, , Disp: , Rfl:     ferrous gluconate (FERGON) 324 MG tablet, Take 1 tablet by mouth Daily., Disp: , Rfl:     furosemide (LASIX) 40 MG tablet, Take 1 tablet by mouth., Disp: , Rfl:     glucose blood (Accu-Chek Guide) test strip, Accu-Chek Guide test strips, Disp: , Rfl:     hydrALAZINE (APRESOLINE) 100 MG tablet, , Disp: , Rfl:     Klor-Con M20 20 MEQ CR tablet, Take 1 tablet twice a day by oral route as directed for 90 days., Disp: , Rfl:     Lantus SoloStar 100 UNIT/ML injection pen, 12 Units Every Night., Disp: , Rfl:     lisinopril (PRINIVIL,ZESTRIL) 40 MG tablet, lisinopril 40 mg tablet, Disp: , Rfl:     metFORMIN (GLUCOPHAGE) 500 MG tablet, metformin 500 mg tablet (Patient taking differently: Take 850 mg by mouth Daily.), Disp: , Rfl:     methIMAzole (TAPAZOLE) 10 MG tablet, 1 tablet p.o. daily., Disp: 30 tablet, Rfl: 5    metoprolol succinate XL (TOPROL-XL) 25 MG 24 hr tablet, , Disp: , Rfl:     metoprolol succinate XL (TOPROL-XL) 50 MG 24 hr tablet, 0.5 tablets Daily., Disp: , Rfl:     Multiple Vitamins-Minerals (PRESERVISION AREDS 2 PO), Take  by mouth., Disp: , Rfl:     spironolactone (ALDACTONE) 25 MG tablet, Take 1 tablet by mouth Daily., Disp: , Rfl:     Xdemvy 0.25 % solution, ,  Disp: , Rfl:           Assessment and plan:    Hyperthyroidism: Uncontrolled with TSH mild high, will change methimazole to 5 mg p.o. daily.  Will check TSH and free T4 in 6 weeks.    Multiple thyroid nodules: Ultrasound thyroid did show multiple thyroid nodules but they have been stable for 12+ years.  Therefore the biopsy was not performed.  Thyroid ultrasound was stable back in October 2024.    He follows with his primary care physician for rest of his medical problems.  He is complaining of some dry throat which could be due to lisinopril.  I have advised him to reach out to his primary care physician to address that.  He verbalized understanding.      Assessment and plan from October 14, 2024 reviewed and updated.      Peggy Mason MD FACE.

## 2025-06-25 ENCOUNTER — LAB (OUTPATIENT)
Dept: ENDOCRINOLOGY | Facility: CLINIC | Age: 80
End: 2025-06-25
Payer: MEDICARE

## 2025-06-25 DIAGNOSIS — E05.90 HYPERTHYROIDISM: ICD-10-CM

## 2025-06-26 ENCOUNTER — RESULTS FOLLOW-UP (OUTPATIENT)
Dept: ENDOCRINOLOGY | Facility: CLINIC | Age: 80
End: 2025-06-26
Payer: MEDICARE

## 2025-06-26 LAB
T4 FREE SERPL-MCNC: 1.5 NG/DL (ref 0.82–1.77)
TSH SERPL DL<=0.005 MIU/L-ACNC: 1.6 UIU/ML (ref 0.45–4.5)

## (undated) DEVICE — SNAR POLYP HOTSNARE/BRAIDED OVL/MINI 7F 2.8X10MM 230CM 1P/U

## (undated) DEVICE — PAPR PRNT PK SONY W RIBN UPC55

## (undated) DEVICE — PK ENDO GI 50